# Patient Record
Sex: FEMALE | Race: OTHER
[De-identification: names, ages, dates, MRNs, and addresses within clinical notes are randomized per-mention and may not be internally consistent; named-entity substitution may affect disease eponyms.]

---

## 2020-09-01 ENCOUNTER — HOSPITAL ENCOUNTER (EMERGENCY)
Dept: HOSPITAL 15 - ER | Age: 25
Discharge: HOME | End: 2020-09-01
Payer: MEDICAID

## 2020-09-01 VITALS — SYSTOLIC BLOOD PRESSURE: 137 MMHG | DIASTOLIC BLOOD PRESSURE: 82 MMHG

## 2020-09-01 VITALS — WEIGHT: 180 LBS | HEIGHT: 62 IN | BODY MASS INDEX: 33.13 KG/M2

## 2020-09-01 DIAGNOSIS — E86.0: Primary | ICD-10-CM

## 2020-09-01 DIAGNOSIS — N83.8: ICD-10-CM

## 2020-09-01 LAB
ALBUMIN SERPL-MCNC: 3.5 G/DL (ref 3.4–5)
ALP SERPL-CCNC: 57 U/L (ref 45–117)
ALT SERPL-CCNC: 28 U/L (ref 13–56)
ANION GAP SERPL CALCULATED.3IONS-SCNC: 3 MMOL/L (ref 5–15)
BILIRUB SERPL-MCNC: 0.3 MG/DL (ref 0.2–1)
BUN SERPL-MCNC: 10 MG/DL (ref 7–18)
BUN/CREAT SERPL: 13.9
CALCIUM SERPL-MCNC: 8.2 MG/DL (ref 8.5–10.1)
CHLORIDE SERPL-SCNC: 106 MMOL/L (ref 98–107)
CO2 SERPL-SCNC: 29 MMOL/L (ref 21–32)
GLUCOSE SERPL-MCNC: 112 MG/DL (ref 74–106)
HCT VFR BLD AUTO: 41.2 % (ref 36–46)
HGB BLD-MCNC: 13.6 G/DL (ref 12.2–16.2)
LIPASE SERPL-CCNC: 117 U/L (ref 73–393)
MCH RBC QN AUTO: 31.3 PG (ref 28–32)
MCV RBC AUTO: 94.7 FL (ref 80–100)
NRBC BLD QL AUTO: 0 %
POTASSIUM SERPL-SCNC: 3.7 MMOL/L (ref 3.5–5.1)
PROT SERPL-MCNC: 7.6 G/DL (ref 6.4–8.2)
SODIUM SERPL-SCNC: 138 MMOL/L (ref 136–145)

## 2020-09-01 PROCEDURE — 80053 COMPREHEN METABOLIC PANEL: CPT

## 2020-09-01 PROCEDURE — 83690 ASSAY OF LIPASE: CPT

## 2020-09-01 PROCEDURE — 74176 CT ABD & PELVIS W/O CONTRAST: CPT

## 2020-09-01 PROCEDURE — 99285 EMERGENCY DEPT VISIT HI MDM: CPT

## 2020-09-01 PROCEDURE — 84702 CHORIONIC GONADOTROPIN TEST: CPT

## 2020-09-01 PROCEDURE — 96361 HYDRATE IV INFUSION ADD-ON: CPT

## 2020-09-01 PROCEDURE — 81025 URINE PREGNANCY TEST: CPT

## 2020-09-01 PROCEDURE — 71045 X-RAY EXAM CHEST 1 VIEW: CPT

## 2020-09-01 PROCEDURE — 96374 THER/PROPH/DIAG INJ IV PUSH: CPT

## 2020-09-01 PROCEDURE — 85025 COMPLETE CBC W/AUTO DIFF WBC: CPT

## 2020-09-01 PROCEDURE — 96375 TX/PRO/DX INJ NEW DRUG ADDON: CPT

## 2020-09-01 PROCEDURE — 36415 COLL VENOUS BLD VENIPUNCTURE: CPT

## 2020-09-01 PROCEDURE — 81001 URINALYSIS AUTO W/SCOPE: CPT

## 2020-09-28 ENCOUNTER — HOSPITAL ENCOUNTER (INPATIENT)
Dept: HOSPITAL 15 - ER | Age: 25
LOS: 3 days | Discharge: ANOTHER HOSPITAL | DRG: 284 | End: 2020-10-01
Attending: INTERNAL MEDICINE | Admitting: INTERNAL MEDICINE
Payer: MEDICAID

## 2020-09-28 VITALS — SYSTOLIC BLOOD PRESSURE: 117 MMHG | DIASTOLIC BLOOD PRESSURE: 74 MMHG

## 2020-09-28 VITALS — HEIGHT: 66 IN | WEIGHT: 179.24 LBS | BODY MASS INDEX: 28.81 KG/M2

## 2020-09-28 VITALS — DIASTOLIC BLOOD PRESSURE: 97 MMHG | SYSTOLIC BLOOD PRESSURE: 138 MMHG

## 2020-09-28 VITALS — DIASTOLIC BLOOD PRESSURE: 98 MMHG | SYSTOLIC BLOOD PRESSURE: 149 MMHG

## 2020-09-28 VITALS — DIASTOLIC BLOOD PRESSURE: 71 MMHG | SYSTOLIC BLOOD PRESSURE: 130 MMHG

## 2020-09-28 VITALS — SYSTOLIC BLOOD PRESSURE: 138 MMHG | DIASTOLIC BLOOD PRESSURE: 97 MMHG

## 2020-09-28 DIAGNOSIS — E87.1: ICD-10-CM

## 2020-09-28 DIAGNOSIS — Z82.49: ICD-10-CM

## 2020-09-28 DIAGNOSIS — Z80.43: ICD-10-CM

## 2020-09-28 DIAGNOSIS — N83.201: ICD-10-CM

## 2020-09-28 DIAGNOSIS — K80.62: Primary | ICD-10-CM

## 2020-09-28 DIAGNOSIS — D72.829: ICD-10-CM

## 2020-09-28 DIAGNOSIS — K52.9: ICD-10-CM

## 2020-09-28 DIAGNOSIS — N94.89: ICD-10-CM

## 2020-09-28 DIAGNOSIS — E87.6: ICD-10-CM

## 2020-09-28 DIAGNOSIS — Z20.828: ICD-10-CM

## 2020-09-28 LAB
ALBUMIN SERPL-MCNC: 3.9 G/DL (ref 3.4–5)
ALP SERPL-CCNC: 102 U/L (ref 45–117)
ALT SERPL-CCNC: 47 U/L (ref 13–56)
AMYLASE SERPL-CCNC: 25 U/L (ref 25–115)
ANION GAP SERPL CALCULATED.3IONS-SCNC: 15 MMOL/L (ref 5–15)
APTT PPP: 29.9 SEC (ref 23–31.2)
BILIRUB SERPL-MCNC: 1.4 MG/DL (ref 0.2–1)
BUN SERPL-MCNC: 8 MG/DL (ref 7–18)
BUN/CREAT SERPL: 10
CALCIUM SERPL-MCNC: 9.3 MG/DL (ref 8.5–10.1)
CHLORIDE SERPL-SCNC: 99 MMOL/L (ref 98–107)
CO2 SERPL-SCNC: 19 MMOL/L (ref 21–32)
GLUCOSE SERPL-MCNC: 151 MG/DL (ref 74–106)
HCT VFR BLD AUTO: 42.7 % (ref 36–46)
HGB BLD-MCNC: 14.5 G/DL (ref 12.2–16.2)
INR PPP: 1.06 (ref 0.9–1.15)
LIPASE SERPL-CCNC: 41 U/L (ref 73–393)
MCH RBC QN AUTO: 31.7 PG (ref 28–32)
MCV RBC AUTO: 93.3 FL (ref 80–100)
NRBC BLD QL AUTO: 0 %
POTASSIUM SERPL-SCNC: 3.1 MMOL/L (ref 3.5–5.1)
PROT SERPL-MCNC: 8.7 G/DL (ref 6.4–8.2)
SODIUM SERPL-SCNC: 133 MMOL/L (ref 136–145)

## 2020-09-28 PROCEDURE — 81025 URINE PREGNANCY TEST: CPT

## 2020-09-28 PROCEDURE — 86304 IMMUNOASSAY TUMOR CA 125: CPT

## 2020-09-28 PROCEDURE — 74176 CT ABD & PELVIS W/O CONTRAST: CPT

## 2020-09-28 PROCEDURE — 74177 CT ABD & PELVIS W/CONTRAST: CPT

## 2020-09-28 PROCEDURE — 36415 COLL VENOUS BLD VENIPUNCTURE: CPT

## 2020-09-28 PROCEDURE — 76705 ECHO EXAM OF ABDOMEN: CPT

## 2020-09-28 PROCEDURE — 82378 CARCINOEMBRYONIC ANTIGEN: CPT

## 2020-09-28 PROCEDURE — 82150 ASSAY OF AMYLASE: CPT

## 2020-09-28 PROCEDURE — 85610 PROTHROMBIN TIME: CPT

## 2020-09-28 PROCEDURE — 86850 RBC ANTIBODY SCREEN: CPT

## 2020-09-28 PROCEDURE — 74181 MRI ABDOMEN W/O CONTRAST: CPT

## 2020-09-28 PROCEDURE — 85730 THROMBOPLASTIN TIME PARTIAL: CPT

## 2020-09-28 PROCEDURE — 86900 BLOOD TYPING SEROLOGIC ABO: CPT

## 2020-09-28 PROCEDURE — 81001 URINALYSIS AUTO W/SCOPE: CPT

## 2020-09-28 PROCEDURE — 86301 IMMUNOASSAY TUMOR CA 19-9: CPT

## 2020-09-28 PROCEDURE — 80053 COMPREHEN METABOLIC PANEL: CPT

## 2020-09-28 PROCEDURE — 86901 BLOOD TYPING SEROLOGIC RH(D): CPT

## 2020-09-28 PROCEDURE — 87426 SARSCOV CORONAVIRUS AG IA: CPT

## 2020-09-28 PROCEDURE — 83690 ASSAY OF LIPASE: CPT

## 2020-09-28 PROCEDURE — 85025 COMPLETE CBC W/AUTO DIFF WBC: CPT

## 2020-09-28 RX ADMIN — MORPHINE SULFATE SCH MG: 2 INJECTION, SOLUTION INTRAMUSCULAR; INTRAVENOUS at 18:43

## 2020-09-28 RX ADMIN — MORPHINE SULFATE SCH MG: 2 INJECTION, SOLUTION INTRAMUSCULAR; INTRAVENOUS at 10:59

## 2020-09-28 RX ADMIN — ONDANSETRON HYDROCHLORIDE SCH MG: 2 INJECTION, SOLUTION INTRAMUSCULAR; INTRAVENOUS at 12:00

## 2020-09-28 RX ADMIN — ACETAMINOPHEN SCH MG: 325 TABLET ORAL at 18:43

## 2020-09-28 RX ADMIN — ACETAMINOPHEN SCH MG: 325 TABLET ORAL at 12:40

## 2020-09-28 RX ADMIN — ONDANSETRON HYDROCHLORIDE SCH MG: 2 INJECTION, SOLUTION INTRAMUSCULAR; INTRAVENOUS at 18:43

## 2020-09-28 RX ADMIN — MORPHINE SULFATE SCH MG: 2 INJECTION, SOLUTION INTRAMUSCULAR; INTRAVENOUS at 21:57

## 2020-09-28 RX ADMIN — MORPHINE SULFATE SCH MG: 2 INJECTION, SOLUTION INTRAMUSCULAR; INTRAVENOUS at 14:35

## 2020-09-28 NOTE — NUR
DR ALVARADO SAMUELS AT BEDSIDE. 

DOCTOR SPOKE WITH PATIENT REGARDING TRANSFER TO HIGHER LEVEL OF CARE; PATIENT AGREES WITH 
POC. 

WILL CARRY OUT NEW ORDERS.

## 2020-09-28 NOTE — NUR
OPENING NOTE- NOC SHIFT

PATIENT IS ALERT AND ORIENTED X4, ANSWERS IN COMPLETE SENTENCES. PATIENT IS SITTING UP IN 
BED, BED IS LOCKED AT LOWEST POSITION, BED RAILS UP X2 AND HEAD OF BED IS UP >30 DEGREES. 
PATIENT REPORTS PAIN 8/10; RIGHT UPPER ABDOMINAL QUADRANT RADIATING TO RIGHT UPPER BACK. 
PATIENT STATES "PAIN IS GETTING WORSE". DISCUSSED POC WITH PATIENT AND PAIN MANAGEMENT. 
INSTRUCTED PATIENT TO CALL USING CALL LIGHT PRN; PATIENT VERBALIZED UNDERSTANDING. WILL 
CONTINUE TO MONITOR Q1H AND PRN.

## 2020-09-28 NOTE — NUR
Med Surgical admit from ER

UZMA FRANCO admitted to Telemetry unit after SBAR received. Patient oriented to TAYLER JARAMILLO, RN primary RN, unit, room, bed, and unit policies regarding patient care and 
visiting hours. Patient weighed by bedscale and encouraged to call if they need something. 
All questions and concerns addressed, patient verbalized understanding.

## 2020-09-29 VITALS — SYSTOLIC BLOOD PRESSURE: 130 MMHG | DIASTOLIC BLOOD PRESSURE: 85 MMHG

## 2020-09-29 VITALS — DIASTOLIC BLOOD PRESSURE: 85 MMHG | SYSTOLIC BLOOD PRESSURE: 130 MMHG

## 2020-09-29 VITALS — DIASTOLIC BLOOD PRESSURE: 75 MMHG | SYSTOLIC BLOOD PRESSURE: 127 MMHG

## 2020-09-29 VITALS — DIASTOLIC BLOOD PRESSURE: 70 MMHG | SYSTOLIC BLOOD PRESSURE: 129 MMHG

## 2020-09-29 VITALS — DIASTOLIC BLOOD PRESSURE: 79 MMHG | SYSTOLIC BLOOD PRESSURE: 124 MMHG

## 2020-09-29 VITALS — SYSTOLIC BLOOD PRESSURE: 121 MMHG | DIASTOLIC BLOOD PRESSURE: 80 MMHG

## 2020-09-29 LAB
ALBUMIN SERPL-MCNC: 3 G/DL (ref 3.4–5)
ALP SERPL-CCNC: 85 U/L (ref 45–117)
ALT SERPL-CCNC: 32 U/L (ref 13–56)
ANION GAP SERPL CALCULATED.3IONS-SCNC: 10 MMOL/L (ref 5–15)
BILIRUB SERPL-MCNC: 1.4 MG/DL (ref 0.2–1)
BUN SERPL-MCNC: 3 MG/DL (ref 7–18)
BUN/CREAT SERPL: 6.1
CALCIUM SERPL-MCNC: 8.4 MG/DL (ref 8.5–10.1)
CHLORIDE SERPL-SCNC: 103 MMOL/L (ref 98–107)
CO2 SERPL-SCNC: 21 MMOL/L (ref 21–32)
GLUCOSE SERPL-MCNC: 101 MG/DL (ref 74–106)
HCT VFR BLD AUTO: 38.3 % (ref 36–46)
HGB BLD-MCNC: 12.9 G/DL (ref 12.2–16.2)
MCH RBC QN AUTO: 31.9 PG (ref 28–32)
MCV RBC AUTO: 94.5 FL (ref 80–100)
NRBC BLD QL AUTO: 0.1 %
POTASSIUM SERPL-SCNC: 3.1 MMOL/L (ref 3.5–5.1)
PROT SERPL-MCNC: 7.2 G/DL (ref 6.4–8.2)
SODIUM SERPL-SCNC: 134 MMOL/L (ref 136–145)

## 2020-09-29 RX ADMIN — ACETAMINOPHEN SCH MG: 325 TABLET ORAL at 00:00

## 2020-09-29 RX ADMIN — MORPHINE SULFATE PRN MG: 4 INJECTION, SOLUTION INTRAMUSCULAR; INTRAVENOUS at 07:53

## 2020-09-29 RX ADMIN — ONDANSETRON HYDROCHLORIDE SCH MG: 2 INJECTION, SOLUTION INTRAMUSCULAR; INTRAVENOUS at 01:48

## 2020-09-29 RX ADMIN — POTASSIUM CHLORIDE SCH MEQ: 1500 TABLET, EXTENDED RELEASE ORAL at 22:45

## 2020-09-29 RX ADMIN — POTASSIUM CHLORIDE SCH MLS/HR: 200 INJECTION, SOLUTION INTRAVENOUS at 09:40

## 2020-09-29 RX ADMIN — ACETAMINOPHEN SCH MG: 325 TABLET ORAL at 12:00

## 2020-09-29 RX ADMIN — MORPHINE SULFATE PRN MG: 4 INJECTION, SOLUTION INTRAMUSCULAR; INTRAVENOUS at 14:09

## 2020-09-29 RX ADMIN — POTASSIUM CHLORIDE SCH MLS/HR: 200 INJECTION, SOLUTION INTRAVENOUS at 10:41

## 2020-09-29 RX ADMIN — MORPHINE SULFATE PRN MG: 4 INJECTION, SOLUTION INTRAMUSCULAR; INTRAVENOUS at 01:48

## 2020-09-29 RX ADMIN — ONDANSETRON HYDROCHLORIDE SCH MG: 2 INJECTION, SOLUTION INTRAMUSCULAR; INTRAVENOUS at 06:00

## 2020-09-29 RX ADMIN — ACETAMINOPHEN SCH MG: 325 TABLET ORAL at 18:00

## 2020-09-29 RX ADMIN — ONDANSETRON HYDROCHLORIDE PRN MG: 2 INJECTION, SOLUTION INTRAMUSCULAR; INTRAVENOUS at 14:09

## 2020-09-29 RX ADMIN — MORPHINE SULFATE PRN MG: 4 INJECTION, SOLUTION INTRAMUSCULAR; INTRAVENOUS at 19:52

## 2020-09-29 RX ADMIN — ONDANSETRON HYDROCHLORIDE PRN MG: 2 INJECTION, SOLUTION INTRAMUSCULAR; INTRAVENOUS at 10:09

## 2020-09-29 RX ADMIN — ACETAMINOPHEN SCH MG: 325 TABLET ORAL at 05:06

## 2020-09-29 RX ADMIN — HYDROMORPHONE HYDROCHLORIDE PRN MG: 2 INJECTION, SOLUTION INTRAMUSCULAR; INTRAVENOUS; SUBCUTANEOUS at 22:46

## 2020-09-29 NOTE — NUR
I faxed higher level of care order to [facility], [facility] and [facility].

## 2020-09-29 NOTE — NUR
RESTING ON BED, HEAD OF BED ELEVATED, BED ON LOW POSITION, RAILS UP X2, CALL LIGHT ON REACH, 
REPORT WAS GIVEN TO THE NIGHT SHIFT RN.

## 2020-09-29 NOTE — NUR
PRE OP CONTACTED FOR PENDING SURGERY FOLLOW UP, SURGERY WAS CANCELLED AS REPORTED, WILL 
CONTINUE MONITORING.

## 2020-09-29 NOTE — NUR
IV removal

IV AT LEFT FOREARM DC'd with clean sterile technique, catheter fully intact. Pressure 
dressing applied to site. Patient tolerated well.

## 2020-09-29 NOTE — NUR
I called [facility] and spoke with Addy-placed patient on will-call pending transfer to higher 
level of care.  I spoke with [facility]  Mary Metz-authorization number for 
transfer is [id number], authorization number for [facility] is [id number].  I called [facility] and left message for house supervisor asking about bed 
availability.  I called [facility] and left message as well.

## 2020-09-29 NOTE — NUR
OPENING NOTE- NOC SHIFT

ASSUMED PATIENT CARE. PATIENT IS ALERT AND ORIENTED X4, MAKES GOOD EYE CONTACT. PATIENT IS 
SITTING AT EDGE OF BED BREATHING DEEPLY. PATIENT IS CRYING AND REPORTS 10/10 RIGHT UPPER 
QUADRANT PAIN WHICH RADIATES TO BACK. PAIN MEDICATION IS NOT DUE AT THIS TIME; WILL PAGE 
DOCTOR ABRIL. PROVIDED PATIENT WITH HEAT PACKS. DISCUSSED POC WITH PATIENT AND 
INSTRUCTED PATIENT TO CALL PRN USING CALL LIGHT; PATIENT VERBALIZED UNDERSTANDING.

## 2020-09-29 NOTE — NUR
IV insertion

IV access obtained, via clean sterile technique by inserting 22 gauge catheter at LEFT AC 
after 1 attempt(s). IV secured properly. No trauma to site. Patient tolerated well.

## 2020-09-29 NOTE — NUR
PENDING  POTASSIUM IV AS ORDERED BY DR. ZUNIGA, PENDING SS PROCESS AS ORDERED, WILL CONTINUE 
MONITORING.

## 2020-09-29 NOTE — NUR
I spoke with Dr. ALVARADO Vu regarding the transfer to higher level of care-he stated 
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 
needing gallbladder surgery.  I called [facility] and spoke with Hoda-provided 
her with this additional clinical information.  I also provided her with contact information 
for Dr. Colón per her request.

## 2020-09-29 NOTE — NUR
I received a call from Zee at [facility], provided her with additional 
clinical information as requested.

## 2020-09-29 NOTE — NUR
C/o sever abdominal pain l=10/10, not due for Morphine IV Q6 hours, Dr. Vu was called 
to be notified and left a message, waiting for call back.

## 2020-09-29 NOTE — NUR
RECEIVED PATIENT ALERT AND ORIENTED X4, NOT IN DISTRESS, CLEAR SOUNDS IN BILATERAL LUNG 
LOBES, RR=20 SAT=97%, DEEP BREATHING AND COUGHING ENCOURAGED, DEMONSTRATED AND VERBALIZED 
UNDERSTANDING, DENIED SOB AND CHEST PAIN, HEART RATE=84, ABDOMEN SOFT WITH ACTIVE BS, KEEP 
NPO AS ORDERED, C/O RT. UPPER AND LOWER ABDOMINAL PAIN L=8/10 MORPHINE IV PRN WAS GIVEN PAIN 
DID NOT SUBSIDE AS REPORTED, SKIN INTACT WARM TO TOUCH, AMBULATED TO BR INDEPENDENTLY, BACK 
TO BED, TOLERATED WELL, RESTING ON BED, HEAD OF BED ELEVATED, BED ON LOW POSITION, RAILS UP 
X2, CALL LIGHT ON REACH, PENDING OR TODAY AS ORDERED, PRE OP WAS CONTACTED FOR FOLLOW UP, 
POTASSIUM L=3.1 THIS MORNING AS REPORTED, HOSPITALIST WAS PAGED FOR FOLLOW UP AND TO NOTIFY, 
WAITING FOR CALL BACK, WILL CONTINUE MONITORING.

## 2020-09-29 NOTE — NUR
DR JUDITH SAMUELS AT BEDSIDE

MADE DOCTOR AWARE THAT PATIENT HAS NOT RECEIVED IV POTASSIUM AS ORDERED DUE TO PATIENT BEING 
UNABLE TO TOLERATE POTASSIUM THROUGH VEIN; WILL CARRY OUT NEW POTASSIUM PO ORDER. 

NEW PAIN MEDICATION ORDERS RECEIVED.

## 2020-09-30 VITALS — DIASTOLIC BLOOD PRESSURE: 71 MMHG | SYSTOLIC BLOOD PRESSURE: 114 MMHG

## 2020-09-30 VITALS — DIASTOLIC BLOOD PRESSURE: 88 MMHG | SYSTOLIC BLOOD PRESSURE: 137 MMHG

## 2020-09-30 VITALS — SYSTOLIC BLOOD PRESSURE: 125 MMHG | DIASTOLIC BLOOD PRESSURE: 72 MMHG

## 2020-09-30 VITALS — SYSTOLIC BLOOD PRESSURE: 100 MMHG | DIASTOLIC BLOOD PRESSURE: 70 MMHG

## 2020-09-30 VITALS — SYSTOLIC BLOOD PRESSURE: 108 MMHG | DIASTOLIC BLOOD PRESSURE: 61 MMHG

## 2020-09-30 VITALS — SYSTOLIC BLOOD PRESSURE: 114 MMHG | DIASTOLIC BLOOD PRESSURE: 71 MMHG

## 2020-09-30 VITALS — DIASTOLIC BLOOD PRESSURE: 85 MMHG | SYSTOLIC BLOOD PRESSURE: 130 MMHG

## 2020-09-30 LAB
ALBUMIN SERPL-MCNC: 2.6 G/DL (ref 3.4–5)
ALP SERPL-CCNC: 98 U/L (ref 45–117)
ALT SERPL-CCNC: 22 U/L (ref 13–56)
ANION GAP SERPL CALCULATED.3IONS-SCNC: 11 MMOL/L (ref 5–15)
BILIRUB SERPL-MCNC: 1 MG/DL (ref 0.2–1)
BUN SERPL-MCNC: 2 MG/DL (ref 7–18)
BUN/CREAT SERPL: 4.3
CALCIUM SERPL-MCNC: 8.4 MG/DL (ref 8.5–10.1)
CHLORIDE SERPL-SCNC: 106 MMOL/L (ref 98–107)
CO2 SERPL-SCNC: 21 MMOL/L (ref 21–32)
GLUCOSE SERPL-MCNC: 81 MG/DL (ref 74–106)
HCT VFR BLD AUTO: 36.9 % (ref 36–46)
HGB BLD-MCNC: 12.1 G/DL (ref 12.2–16.2)
MCH RBC QN AUTO: 31.4 PG (ref 28–32)
MCV RBC AUTO: 95.7 FL (ref 80–100)
NRBC BLD QL AUTO: 0.1 %
POTASSIUM SERPL-SCNC: 3.2 MMOL/L (ref 3.5–5.1)
PROT SERPL-MCNC: 6.9 G/DL (ref 6.4–8.2)
SODIUM SERPL-SCNC: 138 MMOL/L (ref 136–145)

## 2020-09-30 RX ADMIN — ACETAMINOPHEN SCH MG: 325 TABLET ORAL at 06:00

## 2020-09-30 RX ADMIN — ACETAMINOPHEN SCH MG: 325 TABLET ORAL at 11:45

## 2020-09-30 RX ADMIN — HYDROMORPHONE HYDROCHLORIDE PRN MG: 2 INJECTION, SOLUTION INTRAMUSCULAR; INTRAVENOUS; SUBCUTANEOUS at 15:38

## 2020-09-30 RX ADMIN — HYDROMORPHONE HYDROCHLORIDE PRN MG: 2 INJECTION, SOLUTION INTRAMUSCULAR; INTRAVENOUS; SUBCUTANEOUS at 20:03

## 2020-09-30 RX ADMIN — ONDANSETRON HYDROCHLORIDE PRN MG: 2 INJECTION, SOLUTION INTRAMUSCULAR; INTRAVENOUS at 11:30

## 2020-09-30 RX ADMIN — POTASSIUM CHLORIDE SCH MEQ: 1500 TABLET, EXTENDED RELEASE ORAL at 01:41

## 2020-09-30 RX ADMIN — HYDROMORPHONE HYDROCHLORIDE PRN MG: 2 INJECTION, SOLUTION INTRAMUSCULAR; INTRAVENOUS; SUBCUTANEOUS at 11:27

## 2020-09-30 RX ADMIN — ONDANSETRON HYDROCHLORIDE PRN MG: 2 INJECTION, SOLUTION INTRAMUSCULAR; INTRAVENOUS at 20:03

## 2020-09-30 RX ADMIN — HYDROMORPHONE HYDROCHLORIDE PRN MG: 2 INJECTION, SOLUTION INTRAMUSCULAR; INTRAVENOUS; SUBCUTANEOUS at 02:52

## 2020-09-30 RX ADMIN — ACETAMINOPHEN SCH MG: 325 TABLET ORAL at 21:51

## 2020-09-30 RX ADMIN — ACETAMINOPHEN SCH MG: 325 TABLET ORAL at 00:00

## 2020-09-30 RX ADMIN — HYDROMORPHONE HYDROCHLORIDE PRN MG: 2 INJECTION, SOLUTION INTRAMUSCULAR; INTRAVENOUS; SUBCUTANEOUS at 06:47

## 2020-09-30 RX ADMIN — POTASSIUM CHLORIDE SCH MEQ: 1500 TABLET, EXTENDED RELEASE ORAL at 06:46

## 2020-09-30 NOTE — NUR
OPENING NOTE- NOC SHIFT

PATIENT IS ALERT AND ORIENTED X4. PATIENT IS SITTING UP IN BED. PATIENT REPORTS ONGOING PAIN 
TO ABDOMEN THROUGHOUT THE DAY AND STATES THAT SHE FEELS NAUSEOUS. DISCUSSED POC WITH PATIENT 
AND INSTRUCTED PATIENT TO CALL PRN; PATIENT VERBALIZED UNDERSTANDING. WILL CONTINUE TO 
MONITOR Q1 HR AND PRN. PROVIDED HEAT PACKS FOR PATIENT; PATIENT STATES SHE FEELS BETTER WHEN 
SHE APPLIES THEM TO ABDOMEN.

## 2020-09-30 NOTE — NUR
PATIENT STATES THAT SHE BEGAN HER MENSTRUAL PERIOD TODAY. 

PADS WERE BROUGHT TO LOBBY FROM [facility].

## 2020-09-30 NOTE — NUR
Nutrition Assessment Notes



Please see attached link for complete assessment



Est energy needs BW [id number] kcal (23-25kcal/kg BW) Est protein need 79-86g 
(1-1.1g/kg BW). will reassess prn


-------------------------------------------------------------------------------

Addendum: 20 at 1228 by Cristina Gunn RD

-------------------------------------------------------------------------------

Amended: Links added.

## 2020-09-30 NOTE — NUR
I received a call from Jason at [facility] letting me know that they can not 
present this case to their surgical oncologist because they will want a biopsy to have been 
done.  I spoke with Dr. ALVARADO Vu regarding the plan of care for this patient and to let 
him know what Nabil Cerrato is asking for.  I also let him know that I would be reaching out to 
other facilities as well.  Dr. Vu suggested that I give Nabil Mckeon's 
contact information.  I called [facility] to speak with Jason-he is on the other 
line and will give me a call back.

## 2020-09-30 NOTE — NUR
I called [facility] and spoke with Chelsie-provided her 
with clinical information/contact information for Dr. ALVARADO Vu as well as the nurse's 
station-she is going to call her supervisor to see if this is a patient they can 
accommodate-she will give me a call back.  I called [facility] again and left 
message for Jason.

## 2020-09-30 NOTE — NUR
md lopez called wanted to add possible right oopharectomy onto consent, pt has not sighned 
any consnts due to pt stating the procedure has not been explained to her

## 2020-09-30 NOTE — NUR
I called [facility] and spoke with Jason-provided him with contact information 
for Dr. Mckeon-per Jason he will present this case to his general surgeon and will give me a 
call back.  I received a call from Chelsie at [facility] letting me know 
that they can not accommodate this patient.

## 2020-09-30 NOTE — NUR
Md ellie bedside with patient explaining wyatt and possible oophorectomy due to access 
blockage to gallbladder he may have to remove mass, explained to patient she should be 
transferred due to possible cancer in mass, patient decided she wants to be transferred and 
have both procedures done at another hospital with an oncologist present, Md explained that 
transferring is a long process, pt verbalized understanding, sally  called updated 
her that pt decided not to have surgery here and wants to transfer

## 2020-10-01 VITALS — DIASTOLIC BLOOD PRESSURE: 79 MMHG | SYSTOLIC BLOOD PRESSURE: 121 MMHG

## 2020-10-01 VITALS — SYSTOLIC BLOOD PRESSURE: 129 MMHG | DIASTOLIC BLOOD PRESSURE: 76 MMHG

## 2020-10-01 VITALS — SYSTOLIC BLOOD PRESSURE: 129 MMHG | DIASTOLIC BLOOD PRESSURE: 79 MMHG

## 2020-10-01 VITALS — SYSTOLIC BLOOD PRESSURE: 128 MMHG | DIASTOLIC BLOOD PRESSURE: 82 MMHG

## 2020-10-01 VITALS — SYSTOLIC BLOOD PRESSURE: 138 MMHG | DIASTOLIC BLOOD PRESSURE: 95 MMHG

## 2020-10-01 LAB
ALBUMIN SERPL-MCNC: 2.6 G/DL (ref 3.4–5)
ALP SERPL-CCNC: 103 U/L (ref 45–117)
ALT SERPL-CCNC: 20 U/L (ref 13–56)
ANION GAP SERPL CALCULATED.3IONS-SCNC: 8 MMOL/L (ref 5–15)
BILIRUB SERPL-MCNC: 0.6 MG/DL (ref 0.2–1)
BUN SERPL-MCNC: 3 MG/DL (ref 7–18)
BUN/CREAT SERPL: 8.6
CALCIUM SERPL-MCNC: 8.5 MG/DL (ref 8.5–10.1)
CHLORIDE SERPL-SCNC: 109 MMOL/L (ref 98–107)
CO2 SERPL-SCNC: 22 MMOL/L (ref 21–32)
GLUCOSE SERPL-MCNC: 87 MG/DL (ref 74–106)
HCT VFR BLD AUTO: 34.7 % (ref 36–46)
HCT VFR BLD AUTO: 37 % (ref 36–46)
HGB BLD-MCNC: 11.7 G/DL (ref 12.2–16.2)
HGB BLD-MCNC: 12.4 G/DL (ref 12.2–16.2)
MCH RBC QN AUTO: 31.4 PG (ref 28–32)
MCH RBC QN AUTO: 31.8 PG (ref 28–32)
MCV RBC AUTO: 93.7 FL (ref 80–100)
MCV RBC AUTO: 94 FL (ref 80–100)
NRBC BLD QL AUTO: 0 %
NRBC BLD QL AUTO: 0.1 %
POTASSIUM SERPL-SCNC: 3.5 MMOL/L (ref 3.5–5.1)
PROT SERPL-MCNC: 6.4 G/DL (ref 6.4–8.2)
SODIUM SERPL-SCNC: 139 MMOL/L (ref 136–145)

## 2020-10-01 RX ADMIN — ONDANSETRON HYDROCHLORIDE PRN MG: 2 INJECTION, SOLUTION INTRAMUSCULAR; INTRAVENOUS at 04:35

## 2020-10-01 RX ADMIN — HYDROMORPHONE HYDROCHLORIDE PRN MG: 2 INJECTION, SOLUTION INTRAMUSCULAR; INTRAVENOUS; SUBCUTANEOUS at 04:35

## 2020-10-01 RX ADMIN — HYDROMORPHONE HYDROCHLORIDE PRN MG: 2 INJECTION, SOLUTION INTRAMUSCULAR; INTRAVENOUS; SUBCUTANEOUS at 00:12

## 2020-10-01 RX ADMIN — HYDROMORPHONE HYDROCHLORIDE PRN MG: 2 INJECTION, SOLUTION INTRAMUSCULAR; INTRAVENOUS; SUBCUTANEOUS at 18:00

## 2020-10-01 RX ADMIN — HYDROMORPHONE HYDROCHLORIDE PRN MG: 2 INJECTION, SOLUTION INTRAMUSCULAR; INTRAVENOUS; SUBCUTANEOUS at 13:27

## 2020-10-01 RX ADMIN — ACETAMINOPHEN SCH MG: 325 TABLET ORAL at 06:00

## 2020-10-01 RX ADMIN — ONDANSETRON HYDROCHLORIDE PRN MG: 2 INJECTION, SOLUTION INTRAMUSCULAR; INTRAVENOUS at 00:12

## 2020-10-01 RX ADMIN — HYDROMORPHONE HYDROCHLORIDE PRN MG: 2 INJECTION, SOLUTION INTRAMUSCULAR; INTRAVENOUS; SUBCUTANEOUS at 08:46

## 2020-10-01 RX ADMIN — ACETAMINOPHEN SCH MG: 325 TABLET ORAL at 00:00

## 2020-10-01 RX ADMIN — ONDANSETRON HYDROCHLORIDE PRN MG: 2 INJECTION, SOLUTION INTRAMUSCULAR; INTRAVENOUS at 13:28

## 2020-10-01 RX ADMIN — ACETAMINOPHEN SCH MG: 325 TABLET ORAL at 12:31

## 2020-10-01 RX ADMIN — ACETAMINOPHEN SCH MG: 325 TABLET ORAL at 18:04

## 2020-10-01 NOTE — NUR
CALLED [facility] -[phone] REPORT GIVEN TO SESAR WADDELL, ALL QUESTIONS AND CONCERNS 
ADDRESSED.

## 2020-10-01 NOTE — NUR
1245 10/1/20 - Faxed to [facility] at [phone] the completed Transfer Back 
Agreement and the facility authorization.

-------------------------------------------------------------------------------

Addendum: 10/01/20 at 1619 by Jacquie Trejo RN, CM

-------------------------------------------------------------------------------

2370 10/1/20 - Contacted by [facility] transfer center coordinator Jason who provided assigned 
bed unit 4100, room 4 bed 2, report can be called to [phone], please include all 
imaging and clincals with patient. Scheduled  time with [facility] is 1830.

-------------------------------------------------------------------------------

Addendum: 10/01/20 at 1621 by Jacquie Trejo RN, CM

-------------------------------------------------------------------------------

1500 10/1/20 - Informed nurse Van caring for patient of all information stated above

## 2020-10-01 NOTE — NUR
ENDORSED PATIENT CARE TO DAY SHIFT NURSE RICHARD RN. 

PATIENT IS IN BED. NO S/SX OF DISTRESS OR SOB.

## 2020-10-01 NOTE — NUR
TRANSFERRED TO [facility], PT LEFT UNIT WITH ALL BELONGING, ACCOMPANIED BY 2 EMTs, NO ACUTE 
DISTRESS NOTED AT DEPARTURE.